# Patient Record
Sex: MALE | Employment: OTHER | URBAN - METROPOLITAN AREA
[De-identification: names, ages, dates, MRNs, and addresses within clinical notes are randomized per-mention and may not be internally consistent; named-entity substitution may affect disease eponyms.]

---

## 2019-07-05 ENCOUNTER — ANESTHESIA (OUTPATIENT)
Dept: OPERATING ROOM | Age: 79
End: 2019-07-05
Payer: MEDICARE

## 2019-07-05 ENCOUNTER — ANESTHESIA EVENT (OUTPATIENT)
Dept: OPERATING ROOM | Age: 79
End: 2019-07-05
Payer: MEDICARE

## 2019-07-05 ENCOUNTER — HOSPITAL ENCOUNTER (OUTPATIENT)
Age: 79
Setting detail: OUTPATIENT SURGERY
Discharge: HOME | End: 2019-07-05
Attending: INTERNAL MEDICINE | Admitting: INTERNAL MEDICINE
Payer: MEDICARE

## 2019-07-05 VITALS — SYSTOLIC BLOOD PRESSURE: 125 MMHG | OXYGEN SATURATION: 91 % | DIASTOLIC BLOOD PRESSURE: 65 MMHG

## 2019-07-05 VITALS
OXYGEN SATURATION: 93 % | TEMPERATURE: 97.9 F | DIASTOLIC BLOOD PRESSURE: 61 MMHG | HEIGHT: 69 IN | SYSTOLIC BLOOD PRESSURE: 141 MMHG | WEIGHT: 254.85 LBS | RESPIRATION RATE: 21 BRPM | BODY MASS INDEX: 37.75 KG/M2 | HEART RATE: 61 BPM

## 2019-07-05 PROCEDURE — 3609012400 HC EGD TRANSORAL BIOPSY SINGLE/MULTIPLE: Performed by: INTERNAL MEDICINE

## 2019-07-05 PROCEDURE — 2709999900 HC NON-CHARGEABLE SUPPLY: Performed by: INTERNAL MEDICINE

## 2019-07-05 PROCEDURE — 88305 TISSUE EXAM BY PATHOLOGIST: CPT

## 2019-07-05 PROCEDURE — 6360000002 HC RX W HCPCS: Performed by: NURSE ANESTHETIST, CERTIFIED REGISTERED

## 2019-07-05 PROCEDURE — 2500000003 HC RX 250 WO HCPCS: Performed by: NURSE ANESTHETIST, CERTIFIED REGISTERED

## 2019-07-05 PROCEDURE — 2580000003 HC RX 258: Performed by: NURSE ANESTHETIST, CERTIFIED REGISTERED

## 2019-07-05 PROCEDURE — 3609010300 HC COLONOSCOPY W/BIOPSY SINGLE/MULTIPLE: Performed by: INTERNAL MEDICINE

## 2019-07-05 PROCEDURE — 87077 CULTURE AEROBIC IDENTIFY: CPT

## 2019-07-05 PROCEDURE — 3700000000 HC ANESTHESIA ATTENDED CARE: Performed by: INTERNAL MEDICINE

## 2019-07-05 PROCEDURE — 7100000010 HC PHASE II RECOVERY - FIRST 15 MIN: Performed by: INTERNAL MEDICINE

## 2019-07-05 PROCEDURE — 7100000011 HC PHASE II RECOVERY - ADDTL 15 MIN: Performed by: INTERNAL MEDICINE

## 2019-07-05 PROCEDURE — 3700000001 HC ADD 15 MINUTES (ANESTHESIA): Performed by: INTERNAL MEDICINE

## 2019-07-05 RX ORDER — SODIUM CHLORIDE, SODIUM LACTATE, POTASSIUM CHLORIDE, CALCIUM CHLORIDE 600; 310; 30; 20 MG/100ML; MG/100ML; MG/100ML; MG/100ML
INJECTION, SOLUTION INTRAVENOUS CONTINUOUS
Status: DISCONTINUED | OUTPATIENT
Start: 2019-07-05 | End: 2019-07-05 | Stop reason: HOSPADM

## 2019-07-05 RX ORDER — SODIUM CHLORIDE, SODIUM LACTATE, POTASSIUM CHLORIDE, CALCIUM CHLORIDE 600; 310; 30; 20 MG/100ML; MG/100ML; MG/100ML; MG/100ML
INJECTION, SOLUTION INTRAVENOUS CONTINUOUS PRN
Status: DISCONTINUED | OUTPATIENT
Start: 2019-07-05 | End: 2019-07-05 | Stop reason: SDUPTHER

## 2019-07-05 RX ORDER — DOXYCYCLINE HYCLATE 50 MG/1
324 CAPSULE, GELATIN COATED ORAL
COMMUNITY

## 2019-07-05 RX ORDER — LIDOCAINE HYDROCHLORIDE 20 MG/ML
INJECTION, SOLUTION INFILTRATION; PERINEURAL PRN
Status: DISCONTINUED | OUTPATIENT
Start: 2019-07-05 | End: 2019-07-05 | Stop reason: SDUPTHER

## 2019-07-05 RX ORDER — PROPOFOL 10 MG/ML
INJECTION, EMULSION INTRAVENOUS PRN
Status: DISCONTINUED | OUTPATIENT
Start: 2019-07-05 | End: 2019-07-05 | Stop reason: SDUPTHER

## 2019-07-05 RX ADMIN — PROPOFOL 60 MG: 10 INJECTION, EMULSION INTRAVENOUS at 13:44

## 2019-07-05 RX ADMIN — PROPOFOL 60 MG: 10 INJECTION, EMULSION INTRAVENOUS at 13:39

## 2019-07-05 RX ADMIN — SODIUM CHLORIDE, POTASSIUM CHLORIDE, SODIUM LACTATE AND CALCIUM CHLORIDE: 600; 310; 30; 20 INJECTION, SOLUTION INTRAVENOUS at 13:35

## 2019-07-05 RX ADMIN — PROPOFOL 60 MG: 10 INJECTION, EMULSION INTRAVENOUS at 13:47

## 2019-07-05 RX ADMIN — PROPOFOL 60 MG: 10 INJECTION, EMULSION INTRAVENOUS at 13:53

## 2019-07-05 RX ADMIN — LIDOCAINE HYDROCHLORIDE 50 MG: 20 INJECTION, SOLUTION INFILTRATION; PERINEURAL at 13:39

## 2019-07-05 ASSESSMENT — PULMONARY FUNCTION TESTS
PIF_VALUE: 1

## 2019-07-05 ASSESSMENT — PAIN SCALES - GENERAL
PAINLEVEL_OUTOF10: 0

## 2019-07-05 ASSESSMENT — PAIN - FUNCTIONAL ASSESSMENT: PAIN_FUNCTIONAL_ASSESSMENT: 0-10

## 2019-07-05 NOTE — ANESTHESIA POSTPROCEDURE EVALUATION
Department of Anesthesiology  Postprocedure Note    Patient: Marco James  MRN: [id number]  YOB: 1940  Date of evaluation: 7/5/2019  Time:  3:38 PM     Procedure Summary     Date:  07/05/19 Room / Location:  [facility] M1 / [facility] OR    Anesthesia Start:  4924 Anesthesia Stop:  1922    Procedures:       COLONOSCOPY WITH BIOPSY (N/A )      EGD BIOPSY AND CLOTEST (N/A ) Diagnosis:  (DX ANEMIA, GERD)    Surgeon:  Melissa Ohara MD Responsible Provider:  Sally Bynum DO    Anesthesia Type:  general, MAC ASA Status:  3          Anesthesia Type: general, MAC    Jose Phase I: Jose Score: 10    Jose Phase II: Jose Score: 7    Last vitals: Reviewed and per EMR flowsheets.        Anesthesia Post Evaluation    Patient location during evaluation: PACU  Patient participation: complete - patient participated  Level of consciousness: awake and alert  Airway patency: patent  Nausea & Vomiting: no nausea and no vomiting  Complications: no  Cardiovascular status: hemodynamically stable  Respiratory status: acceptable  Hydration status: stable

## 2019-07-05 NOTE — ANESTHESIA PRE PROCEDURE
Department of Anesthesiology  Preprocedure Note       Name:  Jt White   Age:  78 y.o.  :  1940                                          MRN:  [id number]         Date:  2019      Surgeon: Ashely Esposito):  Jaspreet Westbrook MD    Procedure: COLONOSCOPY WITH BIOPSY (N/A )  EGD BIOPSY AND CLOTEST (N/A )    Medications prior to admission:   Prior to Admission medications    Medication Sig Start Date End Date Taking? Authorizing Provider   Enoxaparin Sodium (LOVENOX SC) Inject 40 mg into the skin Indications: pt and family unaware of dosage    Yes Historical Provider, MD   ferrous gluconate (FERGON) 324 (38 Fe) MG tablet Take 324 mg by mouth daily (with breakfast)   Yes Historical Provider, MD   alfuzosin (UROXATRAL) 10 MG SR tablet  3/2/15  Yes Historical Provider, MD   amLODIPine (NORVASC) 5 MG tablet  3/2/15  Yes Historical Provider, MD   bumetanide (BUMEX) 2 MG tablet  1/20/15  Yes Historical Provider, MD   carvedilol (COREG) 12.5 MG tablet  14  Yes Historical Provider, MD   pantoprazole (PROTONIX) 40 MG tablet  2/18/15  Yes Historical Provider, MD   KLOR-CON M20 20 MEQ tablet  1/20/15  Yes Historical Provider, MD   spironolactone (ALDACTONE) 25 MG tablet  2/18/15  Yes Historical Provider, MD   finasteride (PROSCAR) 5 MG tablet Take 1 tablet by mouth daily.  3/16/15  Yes Ishmael John MD   ELIQUIS 5 MG TABS tablet  14   Historical Provider, MD   SYMBICORT 160-4.5 MCG/ACT AERO  14   Historical Provider, MD   simvastatin (ZOCOR) 20 MG tablet  3/7/15   Historical Provider, MD       Current medications:    Current Facility-Administered Medications   Medication Dose Route Frequency Provider Last Rate Last Dose    lactated ringers infusion   Intravenous Continuous Sofía Read, DO   Stopped at 19 1450    lidocaine 1% (buffered) injection 0.5 mL  0.5 mL Infiltration Once Sofía Read, DO         Current Outpatient Medications   Medication Sig Dispense Refill    Enoxaparin Sodium (LOVENOX SC) Inject 40 mg into the skin Indications: pt and family unaware of dosage       ferrous gluconate (FERGON) 324 (38 Fe) MG tablet Take 324 mg by mouth daily (with breakfast)      alfuzosin (UROXATRAL) 10 MG SR tablet       amLODIPine (NORVASC) 5 MG tablet       bumetanide (BUMEX) 2 MG tablet       carvedilol (COREG) 12.5 MG tablet       pantoprazole (PROTONIX) 40 MG tablet       KLOR-CON M20 20 MEQ tablet       spironolactone (ALDACTONE) 25 MG tablet       finasteride (PROSCAR) 5 MG tablet Take 1 tablet by mouth daily. 30 tablet 3    ELIQUIS 5 MG TABS tablet       SYMBICORT 160-4.5 MCG/ACT AERO       simvastatin (ZOCOR) 20 MG tablet          Allergies:  No Known Allergies    Problem List:  There is no problem list on file for this patient. Past Medical History:        Diagnosis Date    A-fib [facility])     CHF [facility]congestive [facility] failure) ([facility])     [facility]    H/O: pneumonia     Hyperlipidemia     Hypertension     Obesity        Past Surgical History:        Procedure Laterality Date    COLONOSCOPY      COLONOSCOPY  07/05/2019    with biopsy by Dr. Emily Aguilar COLONOSCOPY N/A 7/5/2019    COLONOSCOPY WITH BIOPSY performed by Analilia Dallas MD at [address]  07/05/2019    with biopsy by Dr. Nena Olivier N/A 7/5/2019    EGD BIOPSY AND CLOTEST performed by Analilia Dallas MD at [place] [address] History:    Social History     Tobacco Use    Smoking status: Never Smoker    Smokeless tobacco: Never Used   Substance Use Topics    Alcohol use:  No                                Counseling given: Not Answered      Vital Signs (Current):   Vitals:    07/05/19 1233 07/05/19 1404 07/05/19 1415 07/05/19 1430   BP:  117/61 (!) 133/59 (!) 141/61   Pulse:  67 62 61   Resp:  12 23 21   Temp:  97.2 °F (36.2 °C)  97.9 °F (36.6 °C)   TempSrc:  Temporal     SpO2:  95% 92% 93%   Weight: 254 lb 13.6 oz (115.6 kg) Height: 5' 9\" (1.753 m)                                                 BP Readings from Last 3 Encounters:   07/05/19 (!) 141/61   07/05/19 125/65   03/16/15 136/61       NPO Status: Time of last liquid consumption: 1000                        Time of last solid consumption: 1000                        Date of last liquid consumption: 07/05/19                        Date of last solid food consumption: 07/03/19    BMI:   Wt Readings from Last 3 Encounters:   07/05/19 254 lb 13.6 oz (115.6 kg)   03/16/15 263 lb (119.3 kg)     Body mass index is 37.64 kg/m². CBC:   Lab Results   Component Value Date    WBC 4.3 04/22/2014    RBC 3.31 04/22/2014    HGB 9.4 04/22/2014    HCT 28.6 04/22/2014    MCV 86.2 04/22/2014    RDW 18.0 04/22/2014     04/22/2014       CMP:   Lab Results   Component Value Date     04/22/2014    K 4.6 04/22/2014    CL 99 04/22/2014    CO2 32 04/22/2014    BUN 21 04/22/2014    CREATININE 0.90 04/22/2014    GFRAA >60 04/22/2014    LABGLOM >60 04/22/2014    GLUCOSE 114 04/22/2014    CALCIUM 8.8 04/22/2014       POC Tests: No results for input(s): POCGLU, POCNA, POCK, POCCL, POCBUN, POCHEMO, POCHCT in the last 72 hours.     Coags: No results found for: PROTIME, INR, APTT    HCG (If Applicable): No results found for: PREGTESTUR, PREGSERUM, HCG, HCGQUANT     ABGs: No results found for: PHART, PO2ART, MVN9NCR, TGH1ASY, BEART, O3KLAWVC     Type & Screen (If Applicable):  No results found for: LABABO, [address]    Anesthesia Evaluation  Patient summary reviewed and Nursing notes reviewed no history of anesthetic complications:   Airway: Mallampati: II  TM distance: >3 FB   Neck ROM: full  Mouth opening: > = 3 FB Dental: normal exam         Pulmonary:normal exam        (-) COPD and asthma                           Cardiovascular:  Exercise tolerance: no interval change,   (+) hypertension:, dysrhythmias: atrial fibrillation, CHF:,           Rate: normal                    Neuro/Psych:

## 2019-07-05 NOTE — H&P
spironolactone (ALDACTONE) 25 MG tablet  2/18/15   Historical Provider, MD   valsartan (DIOVAN) 160 MG tablet  2/18/15   Historical Provider, MD   finasteride (PROSCAR) 5 MG tablet Take 1 tablet by mouth daily. 3/16/15   Mitesh Callahan MD     Allergies  has No Known Allergies. Family History  family history is not on file. Social History   reports that he has never smoked. He has never used smokeless tobacco.   reports that he does not drink alcohol. reports that he does not use drugs. ROS: Pertinent findings in the HPI above. A comprehensive review of systems was essentially negative  DENIES . exertional chest pain, gastrointestinal symptoms, musculoskeletal symptoms and neurologic symptoms  HAS HISTORY   OBJECTIVE:   VITALS:  height is 5' 9\" (1.753 m) and weight is 254 lb 13.6 oz (115.6 kg). His oral temperature is 97.3 °F (36.3 °C). His blood pressure is 149/57 (abnormal) and his pulse is 67. His respiration is 18 and oxygen saturation is 95%. CONSTITUTIONAL:This is a 78 y.o. male who is cooperative, pleasant, alert & orientated x 3, and in no acute distress   SKIN:  Warm and dry, no rashes   HEAD:  Normocephalic, atraumatic   EYES: PERRL. EOMs intact. EARS:  Hearing grossly WNL. NOSE:  Nares patent. No rhinorrhea   THROAT:  Airway is patent, membranes are moist AIRWAY NOT VISIBLE   NECK:supple, no lymphadenopathy  LUNGS: Clear to auscultation bilaterally, no wheezes, rales, or rhonchi. DIMINISHED LEFT BASE   CARDIOVASCULAR: Heart sounds are normal.  Regular rate and rhythm without murmur, gallop or rub.    ABDOMEN: soft, non tender, non distended, no masses or organomegaly bowel sounds present OBESE   EXTREMITIES+ 4  peripheral edema bilateral TO KNEES VASCULAR PIGMENT CHANGES   NEURO: Cranial nerves II-XII grossly intact Strength 5+/5+     Testing:       Lab Review:    CBC:   Lab Results   Component Value Date    WBC 4.3 04/22/2014    RBC 3.31 04/22/2014    HGB 9.4 04/22/2014    HCT 28.6

## 2019-07-06 LAB
DIRECT EXAM: NEGATIVE
Lab: NORMAL
SPECIMEN DESCRIPTION: NORMAL

## 2019-07-08 LAB — SURGICAL PATHOLOGY REPORT: NORMAL

## (undated) DEVICE — BLOCK BITE 60FR RUBBER ADLT DENTAL

## (undated) DEVICE — ADAPTER TBNG LUER STUB 15 GA INTMED

## (undated) DEVICE — BASIN EMSIS 700ML GRAPHITE PLAS KID SHP GRAD

## (undated) DEVICE — CUP MED 1OZ CLR POLYPR FEED GRAD W/O LID

## (undated) DEVICE — FORCEPS BX L240CM WRK CHN 2.8MM STD CAP W/ NDL MIC MESH